# Patient Record
Sex: FEMALE | Race: WHITE | NOT HISPANIC OR LATINO | URBAN - METROPOLITAN AREA
[De-identification: names, ages, dates, MRNs, and addresses within clinical notes are randomized per-mention and may not be internally consistent; named-entity substitution may affect disease eponyms.]

---

## 2017-02-21 ENCOUNTER — APPOINTMENT (RX ONLY)
Dept: URBAN - METROPOLITAN AREA CLINIC 104 | Facility: CLINIC | Age: 77
Setting detail: DERMATOLOGY
End: 2017-02-21

## 2017-02-21 VITALS — HEIGHT: 64 IN | WEIGHT: 170 LBS

## 2017-02-21 DIAGNOSIS — L82.1 OTHER SEBORRHEIC KERATOSIS: ICD-10-CM

## 2017-02-21 DIAGNOSIS — D22 MELANOCYTIC NEVI: ICD-10-CM

## 2017-02-21 DIAGNOSIS — L72.8 OTHER FOLLICULAR CYSTS OF THE SKIN AND SUBCUTANEOUS TISSUE: ICD-10-CM

## 2017-02-21 PROBLEM — L20.84 INTRINSIC (ALLERGIC) ECZEMA: Status: ACTIVE | Noted: 2017-02-21

## 2017-02-21 PROBLEM — D22.9 MELANOCYTIC NEVI, UNSPECIFIED: Status: ACTIVE | Noted: 2017-02-21

## 2017-02-21 PROCEDURE — ? COUNSELING

## 2017-02-21 PROCEDURE — 99203 OFFICE O/P NEW LOW 30 MIN: CPT | Mod: 25

## 2017-02-21 PROCEDURE — ? INCISION AND DRAINAGE

## 2017-02-21 PROCEDURE — 10060 I&D ABSCESS SIMPLE/SINGLE: CPT

## 2017-02-21 ASSESSMENT — LOCATION ZONE DERM: LOCATION ZONE: FACE

## 2017-02-21 ASSESSMENT — LOCATION DETAILED DESCRIPTION DERM: LOCATION DETAILED: LEFT CENTRAL MALAR CHEEK

## 2017-02-21 ASSESSMENT — LOCATION SIMPLE DESCRIPTION DERM: LOCATION SIMPLE: LEFT CHEEK

## 2017-02-21 NOTE — PROCEDURE: INCISION AND DRAINAGE
Anesthesia Volume In Cc: 0.4
Include Sutures?: No
Post-Care Instructions: I reviewed with the patient in detail post-care instructions. Patient should keep wound covered and call the office should any redness, pain, swelling or worsening occur.
Lesion Type: Cyst
Method: 11 blade
Size Of Lesion In Cm (Optional But May Be Required For Some Insurances): 0
Wound Care: Vaseline
Anesthesia Type: 1% lidocaine with epinephrine and a 1:10 solution of 8.4% sodium bicarbonate
Preparation Text: The area was prepped in the usual clean fashion.
Consent was obtained and risks were reviewed including but not limited to delayed wound healing, infection, need for multiple I and D's, and pain.
Detail Level: Simple
Epidermal Closure: simple interrupted
Curette Text (Optional): After the contents were expressed a curette was used to partially remove the cyst wall.
Dressing: no dressing
Epidermal Sutures: 5-0 Prolene